# Patient Record
Sex: FEMALE | Race: OTHER | HISPANIC OR LATINO | URBAN - METROPOLITAN AREA
[De-identification: names, ages, dates, MRNs, and addresses within clinical notes are randomized per-mention and may not be internally consistent; named-entity substitution may affect disease eponyms.]

---

## 2020-10-27 ENCOUNTER — LAB VISIT (OUTPATIENT)
Dept: PRIMARY CARE CLINIC | Facility: OTHER | Age: 40
End: 2020-10-27
Attending: INTERNAL MEDICINE

## 2020-10-27 DIAGNOSIS — Z11.59 SPECIAL SCREENING EXAMINATION FOR UNSPECIFIED VIRAL DISEASE: ICD-10-CM

## 2020-10-27 PROCEDURE — U0003 INFECTIOUS AGENT DETECTION BY NUCLEIC ACID (DNA OR RNA); SEVERE ACUTE RESPIRATORY SYNDROME CORONAVIRUS 2 (SARS-COV-2) (CORONAVIRUS DISEASE [COVID-19]), AMPLIFIED PROBE TECHNIQUE, MAKING USE OF HIGH THROUGHPUT TECHNOLOGIES AS DESCRIBED BY CMS-2020-01-R: HCPCS

## 2020-10-28 LAB — SARS-COV-2 RNA RESP QL NAA+PROBE: NOT DETECTED

## 2021-05-06 ENCOUNTER — IMMUNIZATION (OUTPATIENT)
Dept: FAMILY MEDICINE | Facility: CLINIC | Age: 41
End: 2021-05-06
Payer: COMMERCIAL

## 2021-05-06 DIAGNOSIS — Z23 NEED FOR VACCINATION: Primary | ICD-10-CM

## 2021-05-06 PROCEDURE — 0031A COVID-19,VECTOR-NR,RS-AD26,PF,0.5 ML DOSE VACCINE (JANSSEN): CPT | Mod: PBBFAC | Performed by: FAMILY MEDICINE

## 2022-01-14 ENCOUNTER — LAB VISIT (OUTPATIENT)
Dept: PRIMARY CARE CLINIC | Facility: CLINIC | Age: 42
End: 2022-01-14
Payer: COMMERCIAL

## 2022-01-14 ENCOUNTER — PATIENT MESSAGE (OUTPATIENT)
Dept: ADMINISTRATIVE | Facility: OTHER | Age: 42
End: 2022-01-14
Payer: COMMERCIAL

## 2022-01-14 DIAGNOSIS — Z20.822 CONTACT WITH AND (SUSPECTED) EXPOSURE TO COVID-19: ICD-10-CM

## 2022-01-14 LAB
CTP QC/QA: YES
SARS-COV-2 AG RESP QL IA.RAPID: POSITIVE

## 2022-01-14 PROCEDURE — 87811 SARS-COV-2 COVID19 W/OPTIC: CPT

## 2023-04-25 ENCOUNTER — OFFICE VISIT (OUTPATIENT)
Dept: PAIN MEDICINE | Facility: CLINIC | Age: 43
End: 2023-04-25
Payer: COMMERCIAL

## 2023-04-25 VITALS
HEIGHT: 62 IN | HEART RATE: 66 BPM | WEIGHT: 109 LBS | DIASTOLIC BLOOD PRESSURE: 94 MMHG | SYSTOLIC BLOOD PRESSURE: 149 MMHG | BODY MASS INDEX: 20.06 KG/M2

## 2023-04-25 DIAGNOSIS — M54.2 CERVICALGIA: Primary | ICD-10-CM

## 2023-04-25 DIAGNOSIS — M54.12 CERVICAL RADICULOPATHY: ICD-10-CM

## 2023-04-25 PROCEDURE — 1160F PR REVIEW ALL MEDS BY PRESCRIBER/CLIN PHARMACIST DOCUMENTED: ICD-10-PCS | Mod: CPTII,S$GLB,, | Performed by: PHYSICIAN ASSISTANT

## 2023-04-25 PROCEDURE — 1159F PR MEDICATION LIST DOCUMENTED IN MEDICAL RECORD: ICD-10-PCS | Mod: CPTII,S$GLB,, | Performed by: PHYSICIAN ASSISTANT

## 2023-04-25 PROCEDURE — 3008F BODY MASS INDEX DOCD: CPT | Mod: CPTII,S$GLB,, | Performed by: PHYSICIAN ASSISTANT

## 2023-04-25 PROCEDURE — 3080F DIAST BP >= 90 MM HG: CPT | Mod: CPTII,S$GLB,, | Performed by: PHYSICIAN ASSISTANT

## 2023-04-25 PROCEDURE — 1160F RVW MEDS BY RX/DR IN RCRD: CPT | Mod: CPTII,S$GLB,, | Performed by: PHYSICIAN ASSISTANT

## 2023-04-25 PROCEDURE — 3077F PR MOST RECENT SYSTOLIC BLOOD PRESSURE >= 140 MM HG: ICD-10-PCS | Mod: CPTII,S$GLB,, | Performed by: PHYSICIAN ASSISTANT

## 2023-04-25 PROCEDURE — 3080F PR MOST RECENT DIASTOLIC BLOOD PRESSURE >= 90 MM HG: ICD-10-PCS | Mod: CPTII,S$GLB,, | Performed by: PHYSICIAN ASSISTANT

## 2023-04-25 PROCEDURE — 99203 PR OFFICE/OUTPT VISIT, NEW, LEVL III, 30-44 MIN: ICD-10-PCS | Mod: S$GLB,,, | Performed by: PHYSICIAN ASSISTANT

## 2023-04-25 PROCEDURE — 99999 PR PBB SHADOW E&M-EST. PATIENT-LVL IV: ICD-10-PCS | Mod: PBBFAC,,, | Performed by: PHYSICIAN ASSISTANT

## 2023-04-25 PROCEDURE — 3077F SYST BP >= 140 MM HG: CPT | Mod: CPTII,S$GLB,, | Performed by: PHYSICIAN ASSISTANT

## 2023-04-25 PROCEDURE — 3008F PR BODY MASS INDEX (BMI) DOCUMENTED: ICD-10-PCS | Mod: CPTII,S$GLB,, | Performed by: PHYSICIAN ASSISTANT

## 2023-04-25 PROCEDURE — 99203 OFFICE O/P NEW LOW 30 MIN: CPT | Mod: S$GLB,,, | Performed by: PHYSICIAN ASSISTANT

## 2023-04-25 PROCEDURE — 99999 PR PBB SHADOW E&M-EST. PATIENT-LVL IV: CPT | Mod: PBBFAC,,, | Performed by: PHYSICIAN ASSISTANT

## 2023-04-25 PROCEDURE — 1159F MED LIST DOCD IN RCRD: CPT | Mod: CPTII,S$GLB,, | Performed by: PHYSICIAN ASSISTANT

## 2023-04-25 RX ORDER — METHYLPREDNISOLONE 4 MG/1
TABLET ORAL
Qty: 1 EACH | Refills: 0 | Status: SHIPPED | OUTPATIENT
Start: 2023-04-25 | End: 2023-05-16

## 2023-04-25 RX ORDER — TIZANIDINE 4 MG/1
4 TABLET ORAL NIGHTLY PRN
Qty: 40 TABLET | Refills: 0 | Status: SHIPPED | OUTPATIENT
Start: 2023-04-25 | End: 2023-06-01

## 2023-04-25 RX ORDER — BRIMONIDINE TARTRATE AND TIMOLOL MALEATE 2; 5 MG/ML; MG/ML
SOLUTION OPHTHALMIC
COMMUNITY
Start: 2022-12-29

## 2023-04-25 NOTE — PROGRESS NOTES
"[facility] New Patient Evaluation      Referred by: Darwinreferral Self    PCP:  none     CC:   Chief Complaint   Patient presents with    Neck Pain    Shoulder Pain     Right shoulder and arm.          HPI:   Griselle Lopez Melendez is a 43 y.o. year old female patient who is relatively healthy.  She presents in self referral for neck, shoulder and arm pain.  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  She descrbies many mnonths of pain in the right side of the neck to the shoulder, arm to the wrist.  It is associated with numbness and tingling.  Some times she has similar left sided pain, but the right side is significantly more.  She has tried tylenol without benefit.  No other treatments tried.    Denies bowel/ bladder incontinence.    Past and current medications:  Antineuropathics:  NSAIDs:  Antidepressants:  Muscle relaxers:  Opioids:  Antiplatelets/Anticoagulants:  Others;  tylenol    Physical Therapy/ Chiropractic care:  none    Pain Intervention History:  none    Past Spine Surgical History:  none        History:  No current outpatient medications on file.    No past medical history on file.    No past surgical history on file.    No family history on file.    Social History     Socioeconomic History    Marital status:    Tobacco Use    Smoking status: Never    Smokeless tobacco: Never       Review of patient's allergies indicates:   Allergen Reactions    Sulfa (sulfonamide antibiotics) Anaphylaxis, Nausea And Vomiting and Other (See Comments)     Diarrhea       Labs:  No results found for: LABA1C, HGBA1C    No results found for: WBC, HGB, HCT, MCV, PLT        Review of Systems:  Neck pain.  Right arm pain.  Balance of review of systems is negative.    Physical Exam:  Vitals:    04/25/23 0817   BP: (!) 149/94   Pulse: 66   Weight: 49.5 kg (109 lb 0.3 oz)   Height: 5' 2" (1.575 m)   PainSc: " 10-Worst pain ever   PainLoc: Arm     Body mass index is 19.94 kg/m².    Gen: NAD  Psych: mood appropriate for given condition  HEENT: eyes anicteric   CV: RRR, 2+ radial pulse  HEENT: anicteric   Respiratory: non-labored, no signs of respiratory distress  Abd: non-distended  Skin: warm, dry and intact.  Gait: Able to heel walk, toe walk. No antalgic gait.     Coordination:   Romberg: negative  Finger to nose coordination: normal  Heel to shin coordination: normal  Tandem walking coordination: normal    Cervical spine: ROM is full in flexion, extension and lateral rotation without increased pain.  Spurling's maneuver causes no neck pain to either side.  Myofascial exam: No Tenderness to palpation across cervical paraspinous region bilaterally.    Lumbar spine:  Lumbar spine: ROM is full with flexion extension and oblique extension with no increased pain.    Guicho's test causes no increased pain on either side.    Supine straight leg raise is negative bilaterally.    Internal and external rotation of the hip causes no increased pain on either side.  Myofascial exam: No tenderness to palpation across lumbar paraspinous muscles. No tenderness to palpation over the bilateral greater trochanters and bilateral SI joint    Sensory:  Intact and symmetrical to light touch in C4-T1 dermatomes bilaterally. Intact and symmetrical to light touch in L1-S1 dermatomes bilaterally.    Motor:    Right Left   C4 Shoulder Abduction  5  5   C5 Elbow Flexion    5  5   C6 Wrist Extension  5  5   C7 Elbow Extension   5  5   C8/T1 Hand Intrinsics   5  5        Right Left   L2/3 Iliacus Hip flexion  5  5   L3/4 Qudratus Femoris Knee Extension  5  5   L4/5 Tib Anterior Ankle Dorsiflexion   5  5   L5/S1 Extensor Hallicus Longus Great toe extension  5  5   S1/S2 Gastroc/Soleus Plantar Flexion  5  5      Right Left   Triceps DTR 2+ 2+   Biceps DTR 2+ 2+   Brachioradialis DTR 2+ 2+   Patellar DTR 2+ 2+   Achilles DTR 2+ 2+   Eldridge Absent   Absent   Clonus Absent Absent   Babinski Absent Absent       Imaging:  No spine imaging.       Assessment:   Griselle Lopez Melendez is a 43 y.o. year old female patient who has no past medical history on file. She presents in self referral for neck and right arm pain.  Likely right C6 radiculopathy no neurological deficits.    Problem List Items Addressed This Visit    None      Plan:  Discussed medications, PT, and interventional procedures.    Will try medrol taper and zanaflex.    Referral to PT.    If no improvement in 8 weeks consider MRI.    Follow up in 8 weeks. She is agreeable to plan.       Follow Up: RTC in 6-8  weeks    : Not applicable    Thank you for referring this interesting patient, and I look forward to continuing to collaborate in her care.        Lita James PA-C  [facility]

## 2023-05-09 ENCOUNTER — CLINICAL SUPPORT (OUTPATIENT)
Dept: REHABILITATION | Facility: HOSPITAL | Age: 43
End: 2023-05-09
Payer: COMMERCIAL

## 2023-05-09 DIAGNOSIS — M54.12 CERVICAL RADICULOPATHY: ICD-10-CM

## 2023-05-09 DIAGNOSIS — M54.2 CERVICALGIA: ICD-10-CM

## 2023-05-09 PROCEDURE — 97161 PT EVAL LOW COMPLEX 20 MIN: CPT | Mod: PO | Performed by: PHYSICAL THERAPIST

## 2023-05-09 NOTE — PLAN OF CARE
[facility]  Physical Therapy Initial Evaluation    Name: Griselle Lopez Melendez  Clinic Number: [id number]    Therapy Diagnosis:   Encounter Diagnoses   Name Primary?    Cervicalgia     Cervical radiculopathy      Physician: Lita James PA-C    Physician Orders: PT Eval and Treat   Medical Diagnosis from Referral:   Cervicalgia   Cervical radiculopathy   Evaluation Date: 5/9/2023  Authorization Period Expiration: 07/09/2023  Plan of Care Expiration: 07/09/2023  Visit # / Visits authorized: 1    Time In: 0700  Time Out: 0800  Total Billable Time: 60 minutes    Precautions: Standard    Subjective   Date of onset: 04/25/2023  History of current condition - Griselle reports: having right sided neck pain today with right shoulder pain as well with lifting, grooming of dogs. No falls noted. Patient recalls having right shoulder pain with certain arm movements while combing the big dogs. No numbness/tingling noted. No radicular pain.     No past medical history on file.  Griselle Lopez Melendez  has no past surgical history on file.    Griselle has a current medication list which includes the following prescription(s): brimonidine-timolol, methylprednisolone, and tizanidine.    Review of patient's allergies indicates:   Allergen Reactions    Sulfa (sulfonamide antibiotics) Anaphylaxis, Nausea And Vomiting and Other (See Comments)     Diarrhea        Imaging, : none noted    Prior Therapy: none noted  Social History:  lives with their spouse  Occupation: Full time  for a place and her own [employer]   Work demands: standing, lifting, UE tasks, grooming  Leisure activities: walking, family time  Prior Level of Function: independent   Current Level of Function/limitation: modified independent, increase time noted with home management.  Recent or major surgery: none noted  Accidents: none noted    Pain:  Current 6/10, worst 10/10, best 0/10   Location: bilateral neck R>L  "side  Description: Aching, Dull, Tight, and Variable  Constant symptoms: no  Intermittent symptoms: yes  Worse: Sitting, Bending, Flexing, Lifting, and right arm raises  Better: ice and rest     Disturbed sleep: yes  Is it positional? yes  Unexplained weight loss: no    Pts goals: Decrease neck pain and right shoulder pain    Objective       Posture  Sitting: Fair  Standing: neutral  Protruded Head yes  Wry neck no  Correction of posture better  Relevant yes for low back    Neurological:  Sensation: Dermatomes:   Right Left Comment   C2/C3 (posterior head/neck) intact intact    C4 intact intact    C5 intact intact    C6 intact intact    C7 intact intact    C8 intact intact    T1 intact intact      DTR:   Right Left Comment   Biceps (C5-6) 2+ 2+    Triceps (C7) 2+ 2+                 Special Tests:  Distraction -   Spurlings  -   Cervical rotation < 60 degrees to affected side    Bakodys Sign +          -   MVA/trauma  Sharp-Konstantin   Alar integrity Y/N  -  -   VA test   Rot/ext x 10" -       Right shoulder impingement test + with apprehension, painful arc with abduction noted    UMN:   Ataxic gait -  Eldridge's: -  Inverted Supinator Sign -  Babinski: -    Upper Limb Neurodynamic testing:  ULTT: (Check Median). Good screen for radiculopathy -    Upper Extremity Strength  (R) UE  (L) UE    C4 Upper trap 4+/5 C4 Upper trap 5/5   C5 Deltoid(90 ABD): 4/5 C5 Deltoid (90 ABD): 5/5   C6 Biceps/ECRB/L 5/5 C6 Biceps/ECRB/L 5/5   C7 triceps/FCR 4/5 C7 triceps/FCR 5/5   C8 Abd.pollicis diane 5/5 C8 Abd. Pollicis diane. 5/5   T1 First Dorsal inter 5/5 T1 First Dorsal inter 5/5                                                                                                                                                                                                          Cervical AROM:                          Movement Loss                     Pain/Dysfunction                     Protusion  Flexion  Retraction 25%  25%  50% Can't " touch sternum to chin. Non-uniform curvature   Extension 50% Non-uniform curvature. Not within 10 degrees of parallel of the horizontal line   Lateral flexion (L)     Lateral flexion (R)     Right rotation 55 degrees Chin/nose not in line with mid-clavicle. +/- 80 degrees   Left rotation 56 degrees Chin/nose not in line with mid-clavicle. +/- 80 degrees     OA -  AA -    Muscle guarding noted with PROM noted initially but was able to improve once unloaded in supine position with increased cervical rotation, suggesting increased tone, myofacial restriction  Joint Mobility: hypo upper T-spine,    PA's ,    Transverse glides ,      Thoracic mobility: decreased right T-spine rotation    Palpation: point tenderness to bilateral cervical paraspinals, right UT, levator scap      Flexibility: pec-minor     CMS Impairment/Limitation/Restriction for FOTO Neck Survey  Status Limitation G-Code CMS Severity Modifier  Intake 46% 54% Current Status CK - At least 40 percent but less than 60 percent  Predicted 65% 35% Goal Status+ CJ - At least 20 percent but less than 40 percent       TREATMENT   Treatment Time In: 730  Treatment Time Out: 735  Total Treatment time separate from Evaluation: 5 minutes    Griselle received therapeutic exercises to develop strength, endurance, ROM, flexibility, posture, and core stabilization for 5 minutes including:  Seated: lumbar ergo roll  Supine: cervical ergo roll  Seated: scapular retractions, ER added  Cervical retraction    Home Exercises and Patient Education Provided    Education provided:   - yes    Written Home Exercises Provided: yes.  Exercises were reviewed and Griselle was able to demonstrate them prior to the end of the session.  Griselle demonstrated good  understanding of the education provided.     See EMR under Patient Instructions for exercises provided 5/9/2023.    Assessment   Griselle is a 43 y.o. female referred to outpatient Physical Therapy with a medical diagnosis of  Cervicalgia   Cervical radiculopathy . Pt presents with neck pain, decreased cervical motion and right shoulder pain with abduction at this time.    Problem List: pain, decreased ROM, decreased flexibility, decreased strength, decreased balance and stability, decreased motor control, inability to participate fully in vocation pursuits, and decreased ability to fully participate in recreational/sports related activities.    Pt prognosis is Good.   Pt will benefit from skilled outpatient Physical Therapy to address the deficits stated above and in the chart below, provide pt/family education, and to maximize pt's level of independence.     Plan of care discussed with patient: Yes  Pt's spiritual, cultural and educational needs considered and patient is agreeable to the plan of care and goals as stated below:     Anticipated Barriers for therapy: none    Medical Necessity is demonstrated by the following  History  Co-morbidities and personal factors that may impact the plan of care Co-morbidities:   History low back pain    Personal Factors:   no deficits     moderate   Examination  Body Structures and Functions, activity limitations and participation restrictions that may impact the plan of care Body Regions:   head  neck  back  upper extremities    Body Systems:    ROM  strength  transitions  motor control    Participation Restrictions:   Home management  Work related tasks    Activity limitations:   Learning and applying knowledge  no deficits    General Tasks and Commands  no deficits    Communication  no deficits    Mobility  lifting and carrying objects    Self care  no deficits    Domestic Life  doing house work (cleaning house, washing dishes, laundry)    Interactions/Relationships  no deficits    Life Areas  no deficits    Community and Social Life  no deficits         high   Clinical Presentation stable and uncomplicated low   Decision Making/ Complexity Score: low     Short Term GOALS:  In 4 weeks, pt.  will:  - improve cervical rotation by 10 degrees for driving purposes.  - improve cervical retraction by 25% for seated neutral tasks.  - improve right shoulder MMT 1/2 grade for grooming purposes.  - decrease outcome measure limitation to <54%    Long Term GOALS:  In 8 weeks, pt. will:  - be independent and compliant with HEP and SX management   - decrease outcome measure limitation to <50%  - demonstrate right shoulder flexion > 150 degrees with no painful limitations.  - demonstrate right shoulder/scapular MMT > 4/5 for UE tasks.  - demonstrate cervical rotation > 60 degrees for mobility purposes.    Plan   Plan of care Certification: 5/9/2023 to 07/07/2023.  Outpatient Physical Therapy 2 times weekly for 8 weeks to include the following interventions: Electrical Stimulation dry needling, Gait Training, Manual Therapy, Moist Heat/ Ice, Neuromuscular Re-ed, Patient Education, Therapeutic Activities, Therapeutic Exercise, and dry needling .      Griselle may at times be seen by a PTA as part of the Rehab Team.    Kyle Dixon, PT

## 2023-06-01 DIAGNOSIS — M54.12 CERVICAL RADICULOPATHY: ICD-10-CM

## 2023-06-01 DIAGNOSIS — M54.2 CERVICALGIA: ICD-10-CM

## 2023-06-01 RX ORDER — TIZANIDINE 4 MG/1
4 TABLET ORAL NIGHTLY PRN
Qty: 40 TABLET | Refills: 0 | Status: SHIPPED | OUTPATIENT
Start: 2023-06-01 | End: 2024-03-27